# Patient Record
Sex: FEMALE | Race: WHITE | Employment: FULL TIME | URBAN - METROPOLITAN AREA
[De-identification: names, ages, dates, MRNs, and addresses within clinical notes are randomized per-mention and may not be internally consistent; named-entity substitution may affect disease eponyms.]

---

## 2018-01-03 ENCOUNTER — OFFICE VISIT (OUTPATIENT)
Dept: INTERNAL MEDICINE CLINIC | Age: 33
End: 2018-01-03

## 2018-01-03 VITALS
WEIGHT: 138 LBS | SYSTOLIC BLOOD PRESSURE: 118 MMHG | HEART RATE: 88 BPM | HEIGHT: 60 IN | DIASTOLIC BLOOD PRESSURE: 78 MMHG | BODY MASS INDEX: 27.09 KG/M2

## 2018-01-03 DIAGNOSIS — F31.60 MIXED BIPOLAR I DISORDER (HCC): ICD-10-CM

## 2018-01-03 DIAGNOSIS — F41.9 ANXIETY: Chronic | ICD-10-CM

## 2018-01-03 DIAGNOSIS — Z23 FLU VACCINE NEED: Primary | ICD-10-CM

## 2018-01-03 DIAGNOSIS — J30.1 CHRONIC SEASONAL ALLERGIC RHINITIS DUE TO POLLEN: Chronic | ICD-10-CM

## 2018-01-03 PROCEDURE — 90471 IMMUNIZATION ADMIN: CPT | Performed by: INTERNAL MEDICINE

## 2018-01-03 PROCEDURE — G8419 CALC BMI OUT NRM PARAM NOF/U: HCPCS | Performed by: INTERNAL MEDICINE

## 2018-01-03 PROCEDURE — 1036F TOBACCO NON-USER: CPT | Performed by: INTERNAL MEDICINE

## 2018-01-03 PROCEDURE — 90630 INFLUENZA, QUADV, 18-64 YRS, ID, PF, MICRO INJ, 0.1ML (FLUZONE QUADV, PF): CPT | Performed by: INTERNAL MEDICINE

## 2018-01-03 PROCEDURE — G8427 DOCREV CUR MEDS BY ELIG CLIN: HCPCS | Performed by: INTERNAL MEDICINE

## 2018-01-03 PROCEDURE — G8484 FLU IMMUNIZE NO ADMIN: HCPCS | Performed by: INTERNAL MEDICINE

## 2018-01-03 PROCEDURE — 99203 OFFICE O/P NEW LOW 30 MIN: CPT | Performed by: INTERNAL MEDICINE

## 2018-01-03 RX ORDER — CETIRIZINE HYDROCHLORIDE 10 MG/1
10 TABLET ORAL
COMMUNITY
End: 2018-01-03 | Stop reason: ALTCHOICE

## 2018-01-03 RX ORDER — GABAPENTIN 100 MG/1
100 CAPSULE ORAL DAILY PRN
COMMUNITY

## 2018-01-03 RX ORDER — PROPRANOLOL HYDROCHLORIDE 20 MG/1
20 TABLET ORAL 2 TIMES DAILY
COMMUNITY

## 2018-01-03 ASSESSMENT — PATIENT HEALTH QUESTIONNAIRE - PHQ9
SUM OF ALL RESPONSES TO PHQ QUESTIONS 1-9: 0
2. FEELING DOWN, DEPRESSED OR HOPELESS: 0
SUM OF ALL RESPONSES TO PHQ9 QUESTIONS 1 & 2: 0
1. LITTLE INTEREST OR PLEASURE IN DOING THINGS: 0

## 2018-01-03 NOTE — PROGRESS NOTES
Subjective:      Patient ID: Andrea Campos is a 33 y.o. female. HPI  Here for a NP establishment,  Mixed bipolar I disorder ([facility] [address]  This is chronic, uses clozapine and gabapetin prn,  Has been stable. Follows psychiatrist.  Continue current medications. Chronic seasonal allergic rhinitis due to pollen  This problem is stable, reviewed in detail, and advised patient to continue the current instructions or medications. Will continue to closely monitor the situation. Anxiety  Continue inderal daily. Also uses lorazepam prn. Sees psychiatrist.     Past Medical History:   Diagnosis Date    Abdominal pain, generalized     Anxiety 1/3/2018    Backache, unspecified     Bipolar I disorder, most recent episode (or current) mixed, unspecified     Chronic rhinitis     Chronic seasonal allergic rhinitis due to pollen 1/3/2018    Dizziness and giddiness     Dysmenorrhea     Galactorrhea not associated with childbirth     GERD (gastroesophageal reflux disease)     Insomnia, unspecified     Irritable bowel syndrome     Other abnormal glucose     hyperglycemia    Other convulsions     Pain in joint, ankle and foot     Proteinuria     Schizophrenia (HCC)     Unspecified disorder of thyroid     Vaginitis and vulvovaginitis, unspecified        Past Surgical History:   Procedure Laterality Date    TUBAL LIGATION  2010       Current Outpatient Prescriptions   Medication Sig Dispense Refill    gabapentin (NEURONTIN) 100 MG capsule Take 100 mg by mouth daily as needed.  propranolol (INDERAL) 20 MG tablet Take 20 mg by mouth 2 times daily      valACYclovir (VALTREX) 500 MG tablet Take 1 tablet by mouth daily. 30 tablet 0    Magnesium Hydroxide (MILK OF MAGNESIA PO) Take  by mouth daily as needed.       clozapine (CLOZARIL) 100 MG tablet Take 100 mg by mouth nightly 1 in the am, 2 in pm      bismuth subsalicylate (BISMATROL) 262 MG/15ML suspension Take 30 mLs by mouth every 4 hours as needed (nausea, vomiting). 1 Bottle 5    acetaminophen (TYLENOL) 325 MG tablet One or two tablets every 4 hours as needed for headache       lidocaine (LIDODERM) 5 % Place 1 patch onto the skin every 24 hours. No current facility-administered medications for this visit. Allergies   Allergen Reactions    Bactrim     Macrobid [Nitrofurantoin Monohydrate Macrocrystals]        Social History     Social History    Marital status: Single     Spouse name: N/A    Number of children: N/A    Years of education: N/A     Occupational History    Not on file. Social History Main Topics    Smoking status: Never Smoker    Smokeless tobacco: Never Used    Alcohol use Yes    Drug use: No    Sexual activity: Not Currently     Other Topics Concern    Not on file     Social History Narrative    No narrative on file       Family History   Problem Relation Age of Onset    Adopted: Yes      Review of Systems  ROS: No unusual headaches or allergy symptoms or blurred vision. No prolonged cough. No flushing or facial pain or chest pain,dizziness, dyspnea, palpitations, or chest pain on exertion. No syncope. No nausea or vommitting or diarrhea. No jaundice or abdominal pain, change in bowel habits, black or bloody stools. No dysuria or hematuria or frequency of urination. No myalgias or muscle pain. No numbness, weakness, or tingling. No falls, or loss of consciousness. No weight loss or back pain. No falls. No paresthesias. No joint swelling or redness. No joint pain. No recent weight loss. No focal weakness or sensory deficits or paresthesias, No confusion or altered sensorium. No hematemesis. No hearing loss. No siezures. All other systems were reviewed, and review was negative.    Objective:   Physical Exam  /78 (Site: Right Arm, Position: Sitting, Cuff Size: Medium Adult)   Pulse 88   Ht 5' (1.524 m)   Wt 138 lb (62.6 kg)   BMI 26.95 kg/m²    The physical exam reveals a patient who appears well,

## 2018-01-03 NOTE — ASSESSMENT & PLAN NOTE
This is chronic, uses clozapine and gabapetin prn,  Has been stable. Follows psychiatrist.  Continue current medications.

## 2018-01-03 NOTE — PROGRESS NOTES
Vaccine Information Sheet, \"Influenza - Inactivated\"  given to [employer], or parent/legal guardian of  Geremias and verbalized understanding. Patient responses:    Have you ever had a reaction to a flu vaccine? No  Are you able to eat eggs without adverse effects? Yes  Do you have any current illness? No  Have you ever had Guillian [place] Syndrome? No    Flu vaccine given per order. Please see immunization tab.